# Patient Record
Sex: FEMALE | Race: ASIAN | NOT HISPANIC OR LATINO | Employment: UNEMPLOYED | URBAN - METROPOLITAN AREA
[De-identification: names, ages, dates, MRNs, and addresses within clinical notes are randomized per-mention and may not be internally consistent; named-entity substitution may affect disease eponyms.]

---

## 2021-02-05 ENCOUNTER — TELEPHONE (OUTPATIENT)
Dept: SCHEDULING | Facility: IMAGING CENTER | Age: 51
End: 2021-02-05

## 2021-03-19 ENCOUNTER — OFFICE VISIT (OUTPATIENT)
Dept: MEDICAL GROUP | Facility: MEDICAL CENTER | Age: 51
End: 2021-03-19
Attending: INTERNAL MEDICINE
Payer: MEDICAID

## 2021-03-19 VITALS
HEIGHT: 61 IN | OXYGEN SATURATION: 97 % | WEIGHT: 138 LBS | RESPIRATION RATE: 16 BRPM | HEART RATE: 82 BPM | BODY MASS INDEX: 26.06 KG/M2 | SYSTOLIC BLOOD PRESSURE: 102 MMHG | DIASTOLIC BLOOD PRESSURE: 68 MMHG | TEMPERATURE: 97.1 F

## 2021-03-19 DIAGNOSIS — Z86.32 HISTORY OF GESTATIONAL DIABETES MELLITUS (GDM): ICD-10-CM

## 2021-03-19 DIAGNOSIS — N92.6 IRREGULAR MENSES: ICD-10-CM

## 2021-03-19 DIAGNOSIS — Z13.220 SCREENING, LIPID: ICD-10-CM

## 2021-03-19 PROCEDURE — 99212 OFFICE O/P EST SF 10 MIN: CPT | Performed by: INTERNAL MEDICINE

## 2021-03-19 PROCEDURE — 99204 OFFICE O/P NEW MOD 45 MIN: CPT | Performed by: INTERNAL MEDICINE

## 2021-03-19 ASSESSMENT — PATIENT HEALTH QUESTIONNAIRE - PHQ9: CLINICAL INTERPRETATION OF PHQ2 SCORE: 0

## 2021-03-19 NOTE — PROGRESS NOTES
Kirk Ellis is a 50 y.o. female here for irregular periods, est care  HPI:    Irregular menses  Reports irregular menses for the past two months.  She will bleed for 4 weeks (sometimes heavy but usually light/spotting) and have a break for 1 week before the bleeding starts again.  She just stopped bleeding 2 days ago.  Before this reports normal menses.  Denies hot flashes, pelvic pain, urinary issues.  Is not sexually active.  This has never happened in the past.  Does not have a gyn provider but reports getting regular PAP smears, last 1 year ago and normal (do not have these records for review).  denies dizziness, lightheadedness, chest pain, difficulty breathing.    Current medicines (including changes today)  No current outpatient medications on file.     No current facility-administered medications for this visit.     She  has a past medical history of GDM (gestational diabetes mellitus) (5/11/2010).  She  has no past surgical history on file.  Social History     Tobacco Use   • Smoking status: Never Smoker   • Smokeless tobacco: Never Used   Substance Use Topics   • Alcohol use: No   • Drug use: No     Social History     Social History Narrative   • Not on file     Family History   Problem Relation Age of Onset   • Cancer Mother         head and neck   • Diabetes Mother    • Lung Disease Father    • Heart Disease Neg Hx    • Stroke Neg Hx    • Hypertension Neg Hx    • Hyperlipidemia Neg Hx          ROS  As above in HPI  All other systems reviewed and are negative     Objective:     Vitals:    03/19/21 1300   BP: 102/68   Pulse: 82   Resp: 16   Temp: 36.2 °C (97.1 °F)   SpO2: 97%     Body mass index is 26.07 kg/m².  Physical Exam:    Constitutional: Alert, no distress.  Skin: Warm, dry, good turgor, no rashes in visible areas.  Eye: Equal, round and reactive, conjunctiva clear, lids normal.  ENMT: Lips without lesions, good dentition, oropharynx clear, TM's clear bilaterally.  Neck: Trachea midline, no  masses, no thyromegaly. No cervical or supraclavicular lymphadenopathy.  Respiratory: Unlabored respiratory effort, lungs clear to auscultation, no wheezes, no ronchi.  Cardiovascular: Regular rate and rhythm, no murmurs appreciated, no lower extremity edema.  Abdomen: Soft, non-tender, no masses, no hepatosplenomegaly.  Psych: Alert and oriented x3, normal affect and mood.        Assessment and Plan:   The following treatment plan was discussed    1. Irregular menses  Unclear etiology.  Could represent perimenopausal irregular bleeding.  Will obtain transvaginal US to eval for endometrial hyperplasia, fibroids, cervical polyp.  Per patient she is up to date on PAPs with normal results recently.  Labs ordered to eval for anemia, thyroid abnormality.  Discussed that if her period returns in the next few days to contact our office, would start her on 5 day course of progesterone to stop bleeding.  GYN referral placed  - CBC WITHOUT DIFFERENTIAL; Future  - FERRITIN; Future  - IRON/TOTAL IRON BIND; Future  - TSH WITH REFLEX TO FT4; Future  - US-PELVIC TRANSVAGINAL ONLY; Future  - REFERRAL TO GYNECOLOGY    2. History of gestational diabetes mellitus (GDM)  - HEMOGLOBIN A1C; Future    3. Screening, lipid  - Lipid Profile; Future        Followup: Return if symptoms worsen or fail to improve.

## 2021-03-19 NOTE — ASSESSMENT & PLAN NOTE
Reports irregular menses for the past two months.  She will bleed for 4 weeks (sometimes heavy but usually light/spotting) and have a break for 1 week before the bleeding starts again.  She just stopped bleeding 2 days ago.  Before this reports normal menses.  Denies hot flashes, pelvic pain, urinary issues.  Is not sexually active.  This has never happened in the past.  Does not have a gyn provider but reports getting regular PAP smears, last 1 year ago and normal (do not have these records for review).  denies dizziness, lightheadedness, chest pain, difficulty breathing.

## 2021-04-02 DIAGNOSIS — D50.0 IRON DEFICIENCY ANEMIA DUE TO CHRONIC BLOOD LOSS: ICD-10-CM

## 2021-04-02 PROBLEM — R73.03 PREDIABETES: Status: ACTIVE | Noted: 2021-04-02

## 2021-04-02 PROBLEM — E78.2 MODERATE MIXED HYPERLIPIDEMIA NOT REQUIRING STATIN THERAPY: Status: ACTIVE | Noted: 2021-04-02

## 2021-04-02 RX ORDER — FERROUS SULFATE 325(65) MG
325 TABLET ORAL DAILY
Qty: 30 TABLET | Refills: 3 | Status: SHIPPED | OUTPATIENT
Start: 2021-04-02